# Patient Record
Sex: MALE | Race: WHITE | Employment: STUDENT | URBAN - METROPOLITAN AREA
[De-identification: names, ages, dates, MRNs, and addresses within clinical notes are randomized per-mention and may not be internally consistent; named-entity substitution may affect disease eponyms.]

---

## 2024-09-06 ENCOUNTER — OFFICE VISIT (OUTPATIENT)
Dept: FAMILY MEDICINE CLINIC | Age: 19
End: 2024-09-06

## 2024-09-06 VITALS
WEIGHT: 163 LBS | SYSTOLIC BLOOD PRESSURE: 132 MMHG | OXYGEN SATURATION: 95 % | TEMPERATURE: 98.5 F | HEART RATE: 102 BPM | DIASTOLIC BLOOD PRESSURE: 84 MMHG

## 2024-09-06 DIAGNOSIS — U07.1 COVID: Primary | ICD-10-CM

## 2024-09-06 DIAGNOSIS — R09.89 SYMPTOMS OF UPPER RESPIRATORY INFECTION (URI): ICD-10-CM

## 2024-09-06 PROCEDURE — 99213 OFFICE O/P EST LOW 20 MIN: CPT | Performed by: PHYSICIAN ASSISTANT

## 2024-09-06 ASSESSMENT — ENCOUNTER SYMPTOMS
BACK PAIN: 0
COUGH: 1
VOMITING: 0
SHORTNESS OF BREATH: 0
CONSTIPATION: 0
NAUSEA: 0
CHEST TIGHTNESS: 0
EYE DISCHARGE: 0
RHINORRHEA: 0
EYE PAIN: 0
WHEEZING: 0
DIARRHEA: 0
SORE THROAT: 1
PHOTOPHOBIA: 0
COLOR CHANGE: 0
ABDOMINAL PAIN: 0
BLOOD IN STOOL: 0
EYE REDNESS: 0

## 2024-09-06 NOTE — PROGRESS NOTES
Nathaniel Pina (:  2005) is a 19 y.o. male,New patient, here for evaluation of the following chief complaint(s):    URI (Congestion sore throat fever achy-started Wednesday night per pt )    This is my first patient encounter with Nathaniel Pina; chart reviewed.     SUBJECTIVE/OBJECTIVE:  HPI  Nathaniel Pina is a pleasant 19 y.o. male presenting to clinic today for URI.  Patient reports onset of sore throat about 36 hours ago, reports associated nasal congestion postnasal drip, reports body aches, chills and fever throughout the night last night.  Reports temp this morning was 102.9.  Reports has been taking NyQuil and Advil which helps temporarily.  Denies any chest congestion, shortness of breath etc.  Reports today his sore throat is better however he continues to have body aches and chills.         Allergies   Allergen Reactions    Cefdinir Rash       No current outpatient medications on file.     No current facility-administered medications for this visit.       /84   Pulse (!) 102   Temp 98.5 °F (36.9 °C) (Infrared)   Wt 73.9 kg (163 lb)   SpO2 95%     Review of Systems   Constitutional:  Positive for chills, fatigue and fever. Negative for appetite change.   HENT:  Positive for congestion, postnasal drip and sore throat. Negative for ear pain, hearing loss and rhinorrhea.    Eyes:  Negative for photophobia, pain, discharge and redness.   Respiratory:  Positive for cough. Negative for chest tightness, shortness of breath and wheezing.    Cardiovascular:  Negative for chest pain, palpitations and leg swelling.   Gastrointestinal:  Negative for abdominal pain, blood in stool, constipation, diarrhea, nausea and vomiting.   Endocrine: Negative for polyuria.   Genitourinary:  Negative for difficulty urinating, dysuria, flank pain, frequency, hematuria and urgency.   Musculoskeletal:  Positive for myalgias. Negative for arthralgias, back pain, gait problem and joint swelling.   Skin: